# Patient Record
Sex: MALE | Race: WHITE
[De-identification: names, ages, dates, MRNs, and addresses within clinical notes are randomized per-mention and may not be internally consistent; named-entity substitution may affect disease eponyms.]

---

## 2018-09-11 ENCOUNTER — HOSPITAL ENCOUNTER (EMERGENCY)
Dept: HOSPITAL 89 - ER | Age: 23
Discharge: HOME | End: 2018-09-11
Payer: COMMERCIAL

## 2018-09-11 DIAGNOSIS — S42.024A: Primary | ICD-10-CM

## 2018-09-11 PROCEDURE — 73000 X-RAY EXAM OF COLLAR BONE: CPT

## 2018-09-11 PROCEDURE — 99283 EMERGENCY DEPT VISIT LOW MDM: CPT

## 2018-09-11 NOTE — RADIOLOGY IMAGING REPORT
FACILITY: [facility] 

 

PATIENT NAME: Jd Banegas

: 1995

MR: [id number]

V: [id number]

EXAM DATE: 

ORDERING PHYSICIAN: LUIS A GUERRA

TECHNOLOGIST: 

 

Location: [facility]

Patient: Jd Banegas

: 1995

MRN: [id number]

Visit/Account:[id number]

Date of Sevice:  2018

 

ACCESSION #: [id number]

 

CLAVICLE RIGHT

 

HISTORY: Bucked off horse, clavicle pain.

 

COMPARISON: None.

 

TECHNIQUE: AP and AP axial views of the right clavicle.

 

FINDINGS: There is a mid right clavicle fracture, nondisplaced. No acromioclavicular joint separation
. Visible thorax is normal.

 

IMPRESSION:

1. Nondisplaced mid right clavicle fracture.

 

Report Dictated By: Tish Mccauley at 2018 10:43 PM

 

Report E-Signed By: Tish Mccauley  at 2018 10:44 PM

 

WSN:[id number]

## 2018-09-11 NOTE — ER REPORT
History and Physical


Time Seen By MD:  21:03


Hx. of Stated Complaint:  


715 pm pt bucked off a horse, fell on r side and limited rom in shoulder, feels 


like he broke his clavicle


HPI/ROS


CHIEF COMPLAINT: right collar bone injury





HISTORY OF PRESENT ILLNESS: This is a 23 year old male. He was bucked off a ho


rse tonight. fell to the right side. Now with pain over right clavicle. H/o 


clavicle fracture in the past, feels similar. Normal sensation in the arm. 


Normal motor function, but with pain. No pain in the neck, back, head. No head 


injury. No loss of consciousness. No chest pain or shortness of breath.


Allergies:  


Uncoded Allergies:  


     NONE (Allergy, Mild, 2/16/09)


Home Meds


Active Scripts


Hydrocodone Bit/Acetaminophen (HYDROCODON-ACETAMINOPHEN 5-325) 1 Each Tablet, 1 


EACH PO Q4H PRN for PAIN, #12 TAB 0 Refills


   Prov:LUIS A GUERRA MD         9/11/18


Discontinued Reported Medications


Acetaminophen/Codeine (Tylenol Codeine Elixir) 5 Ml Elix, 5 ML PO Q4H PRN, #1 0 


Refills


   2/16/09


[None]   No Conflict Check, 0 Refills


   2/16/09


Reviewed Nurses Notes:  Yes


Hx Alcohol Use:  Yes (occ)


Constitutional





Vital Sign - Last 24 Hours








 9/11/18 9/11/18 9/11/18 9/11/18





 20:53 20:58 20:58 21:00


 


Temp   97.5 


 


Pulse ???  94 


 


Resp   16 


 


B/P (MAP)  139/90 (106) 140/103 140/103 (115)


 


Pulse Ox   93 


 


O2 Delivery   Room Air 


 


    





 9/11/18 9/11/18 9/11/18 9/11/18





 21:08 21:23 21:30 21:38


 


Pulse 90 82  82


 


Resp  17  16


 


B/P (MAP)   166/81 (109) 


 


Pulse Ox 93 95  95





 9/11/18 9/11/18 9/11/18 9/11/18





 21:53 21:58 22:00 22:13


 


Pulse 81 83  80


 


Resp 17 16  19


 


B/P (MAP)   ???/??? (1665) 


 


Pulse Ox 94 94  94





 9/11/18   





 22:28   


 


Pulse 86   


 


Resp 8   


 


Pulse Ox 96   








Physical Exam


General: Alert, mild distress.


Musculoskeletal: Holding right arm against chest. Moving the shoulder causes 


pain. Pain with palpation and deformity over mid clavicle. No pain in humerus or


 the rest of the arm. No pain in c-spine or t-spine or scapula.


Neuro: Alert and oriented x4. Normal sensation in hand and arm.


Skin: No skin breakdown.


Cardiovascular: Normal cap refill and radial pulses.





Medical Decision Making


EKG/Imaging


Imaging


CLAVICLE RIGHT


 


HISTORY: Bucked off horse, clavicle pain.


 


COMPARISON: None.


 


TECHNIQUE: AP and AP axial views of the right clavicle.


 


FINDINGS: There is a mid right clavicle fracture, nondisplaced. No 


acromioclavicular joint separation. Visible thorax is normal.


 


IMPRESSION:


1. Nondisplaced mid right clavicle fracture.


 


Report Dictated By: Tish Mccauley at 9/11/2018 10:43 PM





ED Course/Re-evaluation


ED Course


Clavicle fracture noted on x-ray. Lortab and Ibuprofen for pain.


Decision to Disposition Date:  Sep 11, 2018


Decision to Disposition Time:  22:25





Depart


Departure


Latest Vital Signs





Vital Signs








  Date Time  Temp Pulse Resp B/P (MAP) Pulse Ox O2 Delivery O2 Flow Rate FiO2


 


9/11/18 22:28  86 8  96   


 


9/11/18 22:00    ???/??? (1665)    


 


9/11/18 20:58 97.5     Room Air  








Impression:  


   Primary Impression:  


   Clavicle fracture, shaft


Condition:  Improved


Disposition:  HOME OR SELF-CARE


Referrals:  


SOPHIE LEARY MD


New Scripts


Hydrocodone Bit/Acetaminophen (HYDROCODON-ACETAMINOPHEN 5-325) 1 Each Tablet


1 EACH PO Q4H PRN for PAIN, #12 TAB 0 Refills


   Prov: LUIS A GUERRA MD         9/11/18


Patient Instructions:  Clavicle Fracture (ED)





Additional Instructions:  


Ibuprofen 200mg over the counter tablets, take 4 tablets three times a day with 


food.


Lortab 5/325, one every 4 hours as needed for pain.


Apply ice 20 minutes every 1-2 hours while awake.


Use the sling until instructed by orthopedic surgery.


Call [facility] in the morning to arrange a follow-up visit with 


them.





Problem Qualifiers








   Primary Impression:  


   Clavicle fracture, shaft


   Encounter type:  initial encounter  Fracture type:  closed  Fracture 


   alignment:  nondisplaced  Laterality:  right  Qualified Codes:  S42.024A - 


   Nondisplaced fracture of shaft of right clavicle, initial encounter for 


   closed fracture








LUIS A GUERRA MD             Sep 11, 2018 21:23